# Patient Record
Sex: MALE | Race: WHITE | NOT HISPANIC OR LATINO
[De-identification: names, ages, dates, MRNs, and addresses within clinical notes are randomized per-mention and may not be internally consistent; named-entity substitution may affect disease eponyms.]

---

## 2017-07-18 ENCOUNTER — APPOINTMENT (OUTPATIENT)
Dept: VASCULAR SURGERY | Facility: CLINIC | Age: 82
End: 2017-07-18

## 2017-07-18 VITALS
DIASTOLIC BLOOD PRESSURE: 73 MMHG | WEIGHT: 210 LBS | HEIGHT: 70 IN | HEART RATE: 73 BPM | TEMPERATURE: 97.6 F | BODY MASS INDEX: 30.06 KG/M2 | SYSTOLIC BLOOD PRESSURE: 115 MMHG

## 2018-01-23 ENCOUNTER — APPOINTMENT (OUTPATIENT)
Dept: VASCULAR SURGERY | Facility: CLINIC | Age: 83
End: 2018-01-23
Payer: MEDICARE

## 2018-01-23 VITALS
SYSTOLIC BLOOD PRESSURE: 158 MMHG | TEMPERATURE: 97.9 F | HEIGHT: 70 IN | HEART RATE: 76 BPM | BODY MASS INDEX: 30.06 KG/M2 | WEIGHT: 210 LBS | DIASTOLIC BLOOD PRESSURE: 84 MMHG

## 2018-01-23 PROCEDURE — 93978 VASCULAR STUDY: CPT

## 2018-01-23 PROCEDURE — 99213 OFFICE O/P EST LOW 20 MIN: CPT

## 2018-07-24 ENCOUNTER — APPOINTMENT (OUTPATIENT)
Dept: VASCULAR SURGERY | Facility: CLINIC | Age: 83
End: 2018-07-24
Payer: MEDICARE

## 2018-07-24 VITALS
DIASTOLIC BLOOD PRESSURE: 74 MMHG | WEIGHT: 214 LBS | BODY MASS INDEX: 30.64 KG/M2 | SYSTOLIC BLOOD PRESSURE: 144 MMHG | HEART RATE: 71 BPM | TEMPERATURE: 98 F | HEIGHT: 70 IN

## 2018-07-24 DIAGNOSIS — Z86.69 PERSONAL HISTORY OF OTHER DISEASES OF THE NERVOUS SYSTEM AND SENSE ORGANS: ICD-10-CM

## 2018-07-24 PROCEDURE — 99213 OFFICE O/P EST LOW 20 MIN: CPT

## 2018-07-24 PROCEDURE — 93978 VASCULAR STUDY: CPT

## 2018-07-31 ENCOUNTER — MEDICATION RENEWAL (OUTPATIENT)
Age: 83
End: 2018-07-31

## 2018-07-31 RX ORDER — PREDNISONE 50 MG/1
50 TABLET ORAL
Qty: 3 | Refills: 0 | Status: ACTIVE | COMMUNITY
Start: 2018-07-31 | End: 1900-01-01

## 2018-08-09 ENCOUNTER — APPOINTMENT (OUTPATIENT)
Dept: CT IMAGING | Facility: IMAGING CENTER | Age: 83
End: 2018-08-09
Payer: MEDICARE

## 2018-08-09 ENCOUNTER — OUTPATIENT (OUTPATIENT)
Dept: OUTPATIENT SERVICES | Facility: HOSPITAL | Age: 83
LOS: 1 days | End: 2018-08-09
Payer: COMMERCIAL

## 2018-08-09 DIAGNOSIS — Z00.8 ENCOUNTER FOR OTHER GENERAL EXAMINATION: ICD-10-CM

## 2018-08-09 PROCEDURE — 74174 CTA ABD&PLVS W/CONTRAST: CPT

## 2018-08-09 PROCEDURE — 74174 CTA ABD&PLVS W/CONTRAST: CPT | Mod: 26

## 2018-08-09 PROCEDURE — 82565 ASSAY OF CREATININE: CPT

## 2019-01-29 ENCOUNTER — APPOINTMENT (OUTPATIENT)
Dept: VASCULAR SURGERY | Facility: CLINIC | Age: 84
End: 2019-01-29
Payer: MEDICARE

## 2019-01-29 VITALS — HEART RATE: 64 BPM | DIASTOLIC BLOOD PRESSURE: 84 MMHG | SYSTOLIC BLOOD PRESSURE: 158 MMHG | TEMPERATURE: 97.8 F

## 2019-01-29 PROCEDURE — 99213 OFFICE O/P EST LOW 20 MIN: CPT

## 2019-01-29 PROCEDURE — 93978 VASCULAR STUDY: CPT

## 2019-01-29 NOTE — HISTORY OF PRESENT ILLNESS
[FreeTextEntry1] : Asymptomatic  He had  a CT scan  in  in  yhe last  6 months   No endoleaks  sac  is  around  5.2 cm

## 2019-01-29 NOTE — PHYSICAL EXAM
[2+] : left 2+ [Ankle Swelling (On Exam)] : not present [Varicose Veins Of Lower Extremities] : not present [] : not present

## 2019-01-29 NOTE — ASSESSMENT
[FreeTextEntry1] : Good  peripheral pulses    US today  shows  No  endoleaks  sac is  5.2 cm  No  change  return in  6 months  for  US  followup [Aneurysm Surgery] : aneurysm surgery

## 2019-07-30 ENCOUNTER — APPOINTMENT (OUTPATIENT)
Dept: VASCULAR SURGERY | Facility: CLINIC | Age: 84
End: 2019-07-30
Payer: MEDICARE

## 2019-07-30 VITALS
HEART RATE: 68 BPM | WEIGHT: 214 LBS | SYSTOLIC BLOOD PRESSURE: 155 MMHG | BODY MASS INDEX: 30.64 KG/M2 | HEIGHT: 70 IN | TEMPERATURE: 98 F | DIASTOLIC BLOOD PRESSURE: 89 MMHG

## 2019-07-30 PROCEDURE — 93978 VASCULAR STUDY: CPT

## 2019-07-30 PROCEDURE — 99213 OFFICE O/P EST LOW 20 MIN: CPT

## 2019-07-30 NOTE — ASSESSMENT
[FreeTextEntry1] : US bdomen  shows  a stable  5.4 cm Sac  of the  AAA  No  endoleaks  Return in  6 months  for  US  He  is  happy  with the  results  [Aneurysm Surgery] : aneurysm surgery

## 2019-07-30 NOTE — PHYSICAL EXAM
[2+] : right 2+ [Ankle Swelling (On Exam)] : not present [Varicose Veins Of Lower Extremities] : not present [] : not present

## 2019-09-02 ENCOUNTER — TRANSCRIPTION ENCOUNTER (OUTPATIENT)
Age: 84
End: 2019-09-02

## 2020-02-04 ENCOUNTER — APPOINTMENT (OUTPATIENT)
Dept: VASCULAR SURGERY | Facility: CLINIC | Age: 85
End: 2020-02-04
Payer: MEDICARE

## 2020-02-04 VITALS
HEART RATE: 71 BPM | TEMPERATURE: 97.5 F | DIASTOLIC BLOOD PRESSURE: 85 MMHG | BODY MASS INDEX: 30.64 KG/M2 | HEIGHT: 70 IN | WEIGHT: 214 LBS | SYSTOLIC BLOOD PRESSURE: 149 MMHG

## 2020-02-04 DIAGNOSIS — Z00.00 ENCOUNTER FOR GENERAL ADULT MEDICAL EXAMINATION W/OUT ABNORMAL FINDINGS: ICD-10-CM

## 2020-02-04 DIAGNOSIS — Z86.69 PERSONAL HISTORY OF OTHER DISEASES OF THE NERVOUS SYSTEM AND SENSE ORGANS: ICD-10-CM

## 2020-02-04 DIAGNOSIS — N40.0 BENIGN PROSTATIC HYPERPLASIA WITHOUT LOWER URINARY TRACT SYMPMS: ICD-10-CM

## 2020-02-04 DIAGNOSIS — Z87.891 PERSONAL HISTORY OF NICOTINE DEPENDENCE: ICD-10-CM

## 2020-02-04 PROCEDURE — 99213 OFFICE O/P EST LOW 20 MIN: CPT

## 2020-02-04 PROCEDURE — 93978 VASCULAR STUDY: CPT

## 2020-02-04 NOTE — HISTORY OF PRESENT ILLNESS
[FreeTextEntry1] : Endologix  device  used  for  a 5.7 cm AAA in  2014  No  symptoms  Same  meds  He is  here for  a follow up US

## 2020-02-04 NOTE — ASSESSMENT
[FreeTextEntry1] : US  shows  sac is  stable  at  5.5 cm  No endoleaks   return in  6 months  for US [Aneurysm Surgery] : aneurysm surgery

## 2020-08-11 ENCOUNTER — APPOINTMENT (OUTPATIENT)
Dept: VASCULAR SURGERY | Facility: CLINIC | Age: 85
End: 2020-08-11

## 2020-10-01 ENCOUNTER — APPOINTMENT (OUTPATIENT)
Dept: VASCULAR SURGERY | Facility: CLINIC | Age: 85
End: 2020-10-01
Payer: MEDICARE

## 2020-10-01 VITALS
HEART RATE: 71 BPM | HEIGHT: 70 IN | DIASTOLIC BLOOD PRESSURE: 76 MMHG | TEMPERATURE: 97.8 F | SYSTOLIC BLOOD PRESSURE: 133 MMHG

## 2020-10-01 VITALS — SYSTOLIC BLOOD PRESSURE: 120 MMHG | DIASTOLIC BLOOD PRESSURE: 75 MMHG | HEART RATE: 72 BPM

## 2020-10-01 PROCEDURE — 93978 VASCULAR STUDY: CPT

## 2020-10-01 PROCEDURE — 99213 OFFICE O/P EST LOW 20 MIN: CPT

## 2020-10-02 NOTE — REVIEW OF SYSTEMS
[As noted in HPI] : as noted in HPI [As Noted in HPI] : as noted in HPI [Negative] : Gastrointestinal

## 2020-10-05 RX ORDER — PREDNISONE 50 MG/1
50 TABLET ORAL
Qty: 3 | Refills: 0 | Status: ACTIVE | COMMUNITY
Start: 2020-10-05 | End: 1900-01-01

## 2020-10-06 DIAGNOSIS — I71.4 ABDOMINAL AORTIC ANEURYSM, W/OUT RUPTURE: ICD-10-CM

## 2020-10-08 ENCOUNTER — OUTPATIENT (OUTPATIENT)
Dept: OUTPATIENT SERVICES | Facility: HOSPITAL | Age: 85
LOS: 1 days | End: 2020-10-08
Payer: COMMERCIAL

## 2020-10-08 ENCOUNTER — APPOINTMENT (OUTPATIENT)
Dept: CT IMAGING | Facility: IMAGING CENTER | Age: 85
End: 2020-10-08
Payer: MEDICARE

## 2020-10-08 ENCOUNTER — RESULT REVIEW (OUTPATIENT)
Age: 85
End: 2020-10-08

## 2020-10-08 DIAGNOSIS — I71.4 ABDOMINAL AORTIC ANEURYSM, WITHOUT RUPTURE: ICD-10-CM

## 2020-10-08 DIAGNOSIS — Z00.8 ENCOUNTER FOR OTHER GENERAL EXAMINATION: ICD-10-CM

## 2020-10-08 PROCEDURE — 74174 CTA ABD&PLVS W/CONTRAST: CPT | Mod: 26

## 2020-10-08 PROCEDURE — 74174 CTA ABD&PLVS W/CONTRAST: CPT

## 2020-10-08 PROCEDURE — 82565 ASSAY OF CREATININE: CPT

## 2020-10-09 NOTE — HISTORY OF PRESENT ILLNESS
[FreeTextEntry1] : BRONWYN PATEL is a 85 year old male, and former patient of Dr. Gaston, who presents today for surveillance of AAA. He is s/p EVAR with Dr. Gaston (1/2014). Pre-op aneurysm measured 5.7 cm per previous office notes. \par \par Today the patient reports feeling well. Denies any pain in the abdomen/back/legs. Has a R hip replacement from 2015 and has chronic pain/discomfort associated with it. Otherwise denies claudication, rest pain, tissue loss, melena, numbness/tingling in the thigh/buttocks. Denies h/o diabetes, CRI, CVA/TIA, DVT, bleeding disorders, and pulmonary issues.\par \par PMH: HTN, HLD, R retinal detachment, R hip replacement, BPH, AAA s/p EVAR with Dr. Gaston (2014), ex-smoker\par PSx: EVAR, R hip replacement\par Allergies: shellfish/iodine ("throat closes")\par \par Aortoiliac ultrasound today demonstrates a residual sac measuring 5.9 cm. Endograft is patent. No obvious endoleak. \par \par Previous studies\par CTA abd/pelvis (2018) - patent bifurcated graft, 5.3 cm AAA residual sac, no endoleak\par Aortoiliac ultrasounds - residual sac measured 5.5 cm (2/2020), 5.4 cm (7/2019), 5.2 cm (1/2019), 5 cm (7/2018), 4.8 cm (1/2018), 4.6 cm (7/2017), 4.5 cm (7/2016)

## 2020-10-09 NOTE — ASSESSMENT
[FreeTextEntry1] : BRONWYN PATEL is a 85 year old male, and former patient of Dr. Gaston, who presents today for surveillance of AAA. He is s/p EVAR with Dr. Gaston (1/2014). Pre-op aneurysm measured 5.7 cm per previous office notes. \par \par Aortoiliac ultrasound today demonstrates a residual sac measuring 5.9 cm. Endograft is patent. No obvious endoleak. There has been a 4 mm growth in the sac since his last ultrasound in 2/2020 (7 month interval). Past ultrasounds and CTA reviewed and concerning for gradual increase in residual sac in absence of obvious endoleak. \par \par Recommend further evaluation of aneurysm repair with CTA abd/pelvis to rule out an endoleak. Patient reports shellfish allergies ("throat closes") and has been premedicated with PO steroids/antihistamine for contrast studies in the past. Will again premedicate patient prior to CTA. Case discussed with Dr. Montanez.

## 2020-10-09 NOTE — PHYSICAL EXAM
[2+] : left 2+ [0] : left 0 [1+] : left 1+ [Calm] : calm [Skin Ulcer] : no ulcer [de-identified] : well appearing male, NAD  [de-identified] : unlabored  [de-identified] : regular rate  [FreeTextEntry1] : Legs warm and well perfused [de-identified] : obese, NT  [de-identified] : dry flaky skin of LE extremities

## 2020-10-19 ENCOUNTER — NON-APPOINTMENT (OUTPATIENT)
Age: 85
End: 2020-10-19